# Patient Record
Sex: MALE | Race: WHITE | Employment: OTHER | URBAN - METROPOLITAN AREA
[De-identification: names, ages, dates, MRNs, and addresses within clinical notes are randomized per-mention and may not be internally consistent; named-entity substitution may affect disease eponyms.]

---

## 2017-03-23 ENCOUNTER — HOSPITAL ENCOUNTER (OUTPATIENT)
Dept: GENERAL RADIOLOGY | Age: 82
Discharge: HOME | End: 2017-03-23
Payer: COMMERCIAL

## 2017-03-23 DIAGNOSIS — M54.12 CERVICAL RADICULOPATHY, ACUTE: ICD-10-CM

## 2017-03-23 PROCEDURE — 72050 X-RAY EXAM NECK SPINE 4/5VWS: CPT
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
## 2017-06-24 PROBLEM — M54.12 CERVICAL RADICULOPATHY, ACUTE: Status: ACTIVE | Noted: 2017-06-24

## 2017-06-24 PROBLEM — M54.2 ACUTE NECK PAIN: Status: ACTIVE | Noted: 2017-06-24
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
## 2022-03-19 PROBLEM — M54.12 CERVICAL RADICULOPATHY, ACUTE: Status: ACTIVE | Noted: 2017-06-24

## 2022-03-19 PROBLEM — M54.2 ACUTE NECK PAIN: Status: ACTIVE | Noted: 2017-06-24

## 2022-10-03 ENCOUNTER — OFFICE VISIT (OUTPATIENT)
Dept: CARDIOLOGY CLINIC | Age: 87
End: 2022-10-03
Payer: COMMERCIAL

## 2022-10-03 VITALS
WEIGHT: 170 LBS | SYSTOLIC BLOOD PRESSURE: 150 MMHG | HEIGHT: 67 IN | HEART RATE: 70 BPM | DIASTOLIC BLOOD PRESSURE: 82 MMHG | BODY MASS INDEX: 26.68 KG/M2

## 2022-10-03 DIAGNOSIS — I25.119 CORONARY ARTERY DISEASE INVOLVING NATIVE CORONARY ARTERY OF NATIVE HEART WITH ANGINA PECTORIS (HCC): Primary | ICD-10-CM

## 2022-10-03 DIAGNOSIS — E78.2 MIXED HYPERLIPIDEMIA: ICD-10-CM

## 2022-10-03 DIAGNOSIS — I10 ESSENTIAL (PRIMARY) HYPERTENSION: ICD-10-CM

## 2022-10-03 PROCEDURE — 99214 OFFICE O/P EST MOD 30 MIN: CPT | Performed by: INTERNAL MEDICINE

## 2022-10-03 PROCEDURE — 93000 ELECTROCARDIOGRAM COMPLETE: CPT | Performed by: INTERNAL MEDICINE

## 2022-10-03 PROCEDURE — 1123F ACP DISCUSS/DSCN MKR DOCD: CPT | Performed by: INTERNAL MEDICINE

## 2022-10-03 RX ORDER — ASPIRIN 81 MG/1
81 TABLET ORAL DAILY
COMMUNITY

## 2022-10-03 NOTE — PROGRESS NOTES
[address] [place]  [address], [address]  PHONE: [phone]    SUBJECTIVE: /HPI  Dusty Quintero (1935) is a 82 y.o. male seen for a follow up visit regarding the following:        ICD-10-CM ICD-9-CM   1. Coronary artery disease involving native coronary artery of native heart with angina pectoris ([facility] [address]  I25.119 414.01     413.9   2. Essential hypertension  I10 401.9   3. Mixed hyperlipidemia  E78.2 272.2     Pt doing well. No chest pain. No palpitations. Patient denies syncope. No dyspnea. States they are taking meds. Maintains a normal level of activity for them without symptoms. No dizziness or lightheadedness. All above conditions stable. Pt reports taking blood pressure measurements at home and that they are controlled. Past Medical History, Past Surgical History, Family history, Social History, and Medications were all reviewed with the patient today and updated as necessary. Outpatient Medications Marked as Taking for the 3/29/22 encounter (Office Visit) with Sanjuanita Kyle MD   Medication Sig Dispense Refill    zonisamide (ZONEGRAN) 50 mg capsule Take  by mouth daily. b complex vitamins tablet Take 1 Tablet by mouth daily. amLODIPine (NORVASC) 5 mg tablet Take 5 mg by mouth daily. vit A,C,E-zinc-copper (PreserVision AREDS) cap capsule Take 1 Cap by mouth.      losartan (COZAAR) 50 mg tablet Take 25 mg by mouth daily. allopurinol (ZYLOPRIM) 100 mg tablet Take  by mouth daily. nitroglycerin (NITROSTAT) 0.4 mg SL tablet 1 Tab by SubLINGual route every five (5) minutes as needed for Chest Pain. 25 Tab 5    HYDROcodone-acetaminophen (NORCO) 5-325 mg per tablet Take 1 Tab by mouth every four (4) hours as needed for Pain. Prn pain      omeprazole (PRILOSEC) 40 mg capsule Take 40 mg by mouth daily. Aspirin, Buffered 81 mg tab Take 81 mg by mouth daily. atorvastatin (LIPITOR) 10 mg tablet Take 10 mg by mouth daily. Allergies   Allergen Reactions    Gabapentin Unknown (comments)     Depressed    Indocin [Indomethacin] Unknown (comments)    Lyrica [Pregabalin] Other (comments)     Depression      Past Medical History:   Diagnosis Date    Anemia     Arthritis     Coronary artery disease involving native coronary artery of native heart with angina pectoris ([facility] [address] 10/20/2016    Dyspnea on exertion 10/20/2016    Elevated prostate specific antigen (PSA)     GERD (gastroesophageal reflux disease)     H/O blood clots     Heart disease     Hyperlipidemia     Hypertension     Hypertrophy of prostate without urinary obstruction and other lower urinary tract symptoms (LUTS)     Localized edema 10/20/2016    Neoplasm of uncertain behavior of prostate      Past Surgical History:   Procedure Laterality Date    HX BACK SURGERY  1979    HX HERNIA REPAIR      HX MOHS PROCEDURES      HX ORTHOPAEDIC  2004    neck surgery    HX ORTHOPAEDIC      foot surgery    HX OTHER SURGICAL      Laminectomy    HX OTHER SURGICAL      Hydrocelectomy    HX ROTATOR CUFF REPAIR Bilateral      Family History   Problem Relation Age of Onset    Hypertension Mother     Cancer Father     Heart Disease Father     Other Father         kidney stones    Other Other         arthritis      Social History     Tobacco Use    Smoking status: Never Smoker    Smokeless tobacco: Former User   Substance Use Topics    Alcohol use: No     Pt does   have history of early onset cad or heart failure in immediate family members    Current Outpatient Medications:     zonisamide (ZONEGRAN) 50 mg capsule, Take  by mouth daily. , Disp: , Rfl:     b complex vitamins tablet, Take 1 Tablet by mouth daily. , Disp: , Rfl:     amLODIPine (NORVASC) 5 mg tablet, Take 5 mg by mouth daily. , Disp: , Rfl:     vit A,C,E-zinc-copper (PreserVision AREDS) cap capsule, Take 1 Cap by mouth., Disp: , Rfl:     losartan (COZAAR) 50 mg tablet, Take 25 mg by mouth daily. , Disp: , Rfl:     allopurinol (ZYLOPRIM) 100 mg tablet, Take  by mouth daily. , Disp: , Rfl:     nitroglycerin (NITROSTAT) 0.4 mg SL tablet, 1 Tab by SubLINGual route every five (5) minutes as needed for Chest Pain., Disp: 25 Tab, Rfl: 5    HYDROcodone-acetaminophen (NORCO) 5-325 mg per tablet, Take 1 Tab by mouth every four (4) hours as needed for Pain. Prn pain, Disp: , Rfl:     omeprazole (PRILOSEC) 40 mg capsule, Take 40 mg by mouth daily. , Disp: , Rfl:     Aspirin, Buffered 81 mg tab, Take 81 mg by mouth daily. , Disp: , Rfl:     atorvastatin (LIPITOR) 10 mg tablet, Take 10 mg by mouth daily. , Disp: , Rfl:         ROS:  Review of Systems - General ROS: negative for - chills, fatigue or fever  Hematological and Lymphatic ROS: negative for - bleeding problems, bruising or jaundice  Respiratory ROS: no cough, shortness of breath, or wheezing  Cardiovascular ROS: no chest pain or dyspnea on exertion  Gastrointestinal ROS: no abdominal pain, change in bowel habits, or black or bloody stools  Neurological ROS: no TIA or stroke symptoms  All other systems negative.       Wt Readings from Last 3 Encounters:   03/29/22 179 lb 9.6 oz (81.5 kg)   09/28/21 180 lb (81.6 kg)   03/23/21 183 lb (83 kg)     Temp Readings from Last 3 Encounters:   08/23/17 97.2 °F (36.2 °C)   06/23/17 96.1 °F (35.6 °C)     BP Readings from Last 3 Encounters:   03/29/22 132/70   09/28/21 138/80   03/23/21 122/78     Pulse Readings from Last 3 Encounters:   03/29/22 60   09/28/21 66   03/23/21 64           PHYSICAL EXAM:  Visit Vitals  /70   Pulse 60   Ht 5' 7.5\" (1.715 m)   Wt 179 lb 9.6 oz (81.5 kg)   BMI 27.71 kg/m²       Physical Examination: General appearance - alert, well appearing, and in no distress  Mental status - alert, oriented to person, place, and time  Eyes - pupils equal and reactive, extraocular eye movements intact  Neck/lymph - supple, no significant adenopathy  Chest/lungs - clear to auscultation, no wheezes, rales or rhonchi, symmetric air entry  Heart/CV - normal rate, regular rhythm, normal S1, S2, no murmurs, rubs, clicks or gallops  Abdomen/GI - soft, nontender, nondistended, no masses or organomegaly  Musculoskeletal - no joint tenderness, deformity or swelling  Extremities - peripheral pulses normal, no pedal edema, no clubbing or cyanosis  Skin - normal coloration and turgor, no rashes, no suspicious skin lesions noted    EKG: normal EKG, normal sinus rhythm, unchanged from previous tracings. Medications reviewed and questions answered    No results found for this or any previous visit (from the past 672 hour(s)). No results found for: CHOL, CHOLPOCT, CHOLX, CHLST, CHOLV, HDL, HDLPOC, HDLP, LDL, LDLCPOC, LDLC, DLDLP, VLDLC, VLDL, TGLX, TRIGL, TRIGP, TGLPOCT, CHHD, CHHDX      ASSESSMENT and PLAN        1. Coronary artery disease involving native coronary artery of native heart with angina pectoris [facility])  The current medical regimen is effective;  continue present plan and medications. 2. Essential hypertension  The current medical regimen is effective;  continue present plan and medications. 3. Mixed hyperlipidemia  The current medical regimen is effective;  continue present plan and medications. Orders Placed This Encounter    zonisamide (ZONEGRAN) 50 mg capsule     Sig: Take  by mouth daily. Pt is instructed to follow all appropriate cardiac risk factor recommendations and to be compliant with meds and testing. Instructed to follow up appropriately and seek urgent medical care if acute or unstable issues arise. Results of some tests may be viewed thru [address] but this does not substitute for follow up with MD. If follow up is not scheduled pt is instructed to call for follow up      Follow-up and Dispositions    Return in about 6 months (around 9/29/2022).       is for   Specialty Problems          Cardiology Problems    Coronary artery disease involving native coronary artery of native heart with angina pectoris ([facility] [address] Localized edema        Hyperlipidemia        Hypertension                       Sandhya Rosario MD  3/29/2022  1:29 PM

## 2023-04-06 ENCOUNTER — OFFICE VISIT (OUTPATIENT)
Dept: CARDIOLOGY CLINIC | Age: 88
End: 2023-04-06
Payer: COMMERCIAL

## 2023-04-06 VITALS
DIASTOLIC BLOOD PRESSURE: 92 MMHG | SYSTOLIC BLOOD PRESSURE: 148 MMHG | HEART RATE: 55 BPM | HEIGHT: 67 IN | BODY MASS INDEX: 27.15 KG/M2 | WEIGHT: 173 LBS

## 2023-04-06 DIAGNOSIS — I25.119 ATHEROSCLEROSIS OF NATIVE CORONARY ARTERY OF NATIVE HEART WITH ANGINA PECTORIS (HCC): ICD-10-CM

## 2023-04-06 DIAGNOSIS — I10 ESSENTIAL (PRIMARY) HYPERTENSION: ICD-10-CM

## 2023-04-06 DIAGNOSIS — E78.2 MIXED HYPERLIPIDEMIA: ICD-10-CM

## 2023-04-06 DIAGNOSIS — I25.119 CORONARY ARTERY DISEASE INVOLVING NATIVE CORONARY ARTERY OF NATIVE HEART WITH ANGINA PECTORIS (HCC): Primary | ICD-10-CM

## 2023-04-06 PROCEDURE — 1123F ACP DISCUSS/DSCN MKR DOCD: CPT | Performed by: INTERNAL MEDICINE

## 2023-04-06 PROCEDURE — 3077F SYST BP >= 140 MM HG: CPT | Performed by: INTERNAL MEDICINE

## 2023-04-06 PROCEDURE — 93000 ELECTROCARDIOGRAM COMPLETE: CPT | Performed by: INTERNAL MEDICINE

## 2023-04-06 PROCEDURE — 99214 OFFICE O/P EST MOD 30 MIN: CPT | Performed by: INTERNAL MEDICINE

## 2023-04-06 PROCEDURE — 3080F DIAST BP >= 90 MM HG: CPT | Performed by: INTERNAL MEDICINE

## 2023-04-06 RX ORDER — LOSARTAN POTASSIUM 100 MG/1
100 TABLET ORAL DAILY
Qty: 90 TABLET | Refills: 3 | Status: SHIPPED | OUTPATIENT
Start: 2023-04-06

## 2023-04-06 NOTE — PROGRESS NOTES
[address]  PHONE: [phone]    SUBJECTIVE: Nancy Colon (1935) is a 82 y.o. male seen for a follow up visit regarding the following:   Specialty Problems          Cardiology Problems    Chest pain        Coronary artery disease involving native coronary artery of native heart with angina pectoris ([facility] [address]        Hyperlipidemia        Hypertension         Patient has been experiencing SOB after taking baclofen after a failed back procedure. He only took this medicine for a few days when he noticed his heart skipping a beat. He had an episode of palpitations this morning but denies CP associated with this event. Past Medical History, Past Surgical History, Family history, Social History, and Medications were all reviewed with the patient today and updated as necessary.     Allergies   Allergen Reactions    Gabapentin Other (See Comments)     Depressed    Indomethacin Other (See Comments)    Pregabalin Other (See Comments)     Depression      Past Medical History:   Diagnosis Date    Anemia     Arthritis     Coronary artery disease involving native coronary artery of native heart with angina pectoris ([facility] [address] 10/20/2016    Dyspnea on exertion 10/20/2016    Elevated prostate specific antigen (PSA)     GERD (gastroesophageal reflux disease)     H/O blood clots     Heart disease     Hyperlipidemia     Hypertension     Hypertrophy of prostate without urinary obstruction and other lower urinary tract symptoms (LUTS)     Localized edema 10/20/2016    Neoplasm of uncertain behavior of prostate      Past Surgical History:   Procedure Laterality Date    [facility]      foot surgery    ORTHOPEDIC SURGERY  2004    neck surgery    OTHER SURGICAL HISTORY      Hydrocelectomy    OTHER SURGICAL HISTORY      Laminectomy    ROTATOR CUFF REPAIR Bilateral      Family History   Problem

## 2023-04-08 LAB
25(OH)D3+25(OH)D2 SERPL-MCNC: 38.4 NG/ML (ref 30–100)
ALBUMIN SERPL-MCNC: 4.2 G/DL (ref 3.6–4.6)
ALBUMIN/GLOB SERPL: 1.8 {RATIO} (ref 1.2–2.2)
ALP SERPL-CCNC: 122 IU/L (ref 44–121)
ALT SERPL-CCNC: 31 IU/L (ref 0–44)
AST SERPL-CCNC: 30 IU/L (ref 0–40)
BASOPHILS # BLD AUTO: 0.1 X10E3/UL (ref 0–0.2)
BASOPHILS NFR BLD AUTO: 1 %
BILIRUB SERPL-MCNC: 0.6 MG/DL (ref 0–1.2)
BUN SERPL-MCNC: 26 MG/DL (ref 8–27)
BUN/CREAT SERPL: 18 (ref 10–24)
CALCIUM SERPL-MCNC: 9.5 MG/DL (ref 8.6–10.2)
CHLORIDE SERPL-SCNC: 105 MMOL/L (ref 96–106)
CHOLEST SERPL-MCNC: 136 MG/DL (ref 100–199)
CO2 SERPL-SCNC: 22 MMOL/L (ref 20–29)
CREAT SERPL-MCNC: 1.43 MG/DL (ref 0.76–1.27)
EGFRCR SERPLBLD CKD-EPI 2021: 48 ML/MIN/1.73
EOSINOPHIL # BLD AUTO: 0.4 X10E3/UL (ref 0–0.4)
EOSINOPHIL NFR BLD AUTO: 5 %
ERYTHROCYTE [DISTWIDTH] IN BLOOD BY AUTOMATED COUNT: 13 % (ref 11.6–15.4)
GLOBULIN SER CALC-MCNC: 2.4 G/DL (ref 1.5–4.5)
GLUCOSE SERPL-MCNC: 94 MG/DL (ref 70–99)
HCT VFR BLD AUTO: 45.4 % (ref 37.5–51)
HDLC SERPL-MCNC: 43 MG/DL
HGB BLD-MCNC: 15.3 G/DL (ref 13–17.7)
IMM GRANULOCYTES # BLD AUTO: 0 X10E3/UL (ref 0–0.1)
IMM GRANULOCYTES NFR BLD AUTO: 0 %
LDLC SERPL CALC-MCNC: 70 MG/DL (ref 0–99)
LYMPHOCYTES # BLD AUTO: 3 X10E3/UL (ref 0.7–3.1)
LYMPHOCYTES NFR BLD AUTO: 43 %
MCH RBC QN AUTO: 30.6 PG (ref 26.6–33)
MCHC RBC AUTO-ENTMCNC: 33.7 G/DL (ref 31.5–35.7)
MCV RBC AUTO: 91 FL (ref 79–97)
MONOCYTES # BLD AUTO: 0.4 X10E3/UL (ref 0.1–0.9)
MONOCYTES NFR BLD AUTO: 6 %
NEUTROPHILS # BLD AUTO: 3.1 X10E3/UL (ref 1.4–7)
NEUTROPHILS NFR BLD AUTO: 45 %
PLATELET # BLD AUTO: 180 X10E3/UL (ref 150–450)
POTASSIUM SERPL-SCNC: 5 MMOL/L (ref 3.5–5.2)
PROT SERPL-MCNC: 6.6 G/DL (ref 6–8.5)
RBC # BLD AUTO: 5 X10E6/UL (ref 4.14–5.8)
SODIUM SERPL-SCNC: 145 MMOL/L (ref 134–144)
SPECIMEN STATUS REPORT: NORMAL
TRIGL SERPL-MCNC: 129 MG/DL (ref 0–149)
TSH SERPL DL<=0.005 MIU/L-ACNC: 2.67 UIU/ML (ref 0.45–4.5)
VLDLC SERPL CALC-MCNC: 23 MG/DL (ref 5–40)
WBC # BLD AUTO: 7 X10E3/UL (ref 3.4–10.8)

## 2023-07-11 ENCOUNTER — OFFICE VISIT (OUTPATIENT)
Age: 88
End: 2023-07-11
Payer: COMMERCIAL

## 2023-07-11 VITALS
BODY MASS INDEX: 26.93 KG/M2 | HEART RATE: 58 BPM | SYSTOLIC BLOOD PRESSURE: 136 MMHG | WEIGHT: 171.6 LBS | DIASTOLIC BLOOD PRESSURE: 78 MMHG | HEIGHT: 67 IN

## 2023-07-11 DIAGNOSIS — E78.2 MIXED HYPERLIPIDEMIA: ICD-10-CM

## 2023-07-11 DIAGNOSIS — I10 ESSENTIAL (PRIMARY) HYPERTENSION: ICD-10-CM

## 2023-07-11 DIAGNOSIS — I25.119 CORONARY ARTERY DISEASE INVOLVING NATIVE CORONARY ARTERY OF NATIVE HEART WITH ANGINA PECTORIS (HCC): Primary | ICD-10-CM

## 2023-07-11 PROCEDURE — 99214 OFFICE O/P EST MOD 30 MIN: CPT | Performed by: INTERNAL MEDICINE

## 2023-07-11 PROCEDURE — 1123F ACP DISCUSS/DSCN MKR DOCD: CPT | Performed by: INTERNAL MEDICINE

## 2023-07-11 RX ORDER — LOSARTAN POTASSIUM 100 MG/1
100 TABLET ORAL DAILY
Qty: 90 TABLET | Refills: 3 | Status: SHIPPED | OUTPATIENT
Start: 2023-07-11

## 2023-07-11 RX ORDER — NITROGLYCERIN 0.4 MG/1
0.4 TABLET SUBLINGUAL EVERY 5 MIN PRN
Qty: 25 TABLET | Refills: 5 | Status: SHIPPED | OUTPATIENT
Start: 2023-07-11

## 2023-07-11 NOTE — PROGRESS NOTES
[address]  PHONE: [phone]    SUBJECTIVE: Claire Epps (1935) is a 82 y.o. male seen for a follow up visit regarding the following:   Specialty Problems          Cardiology Problems    Chest pain        Coronary artery disease involving native coronary artery of native heart with angina pectoris ([facility])        Hyperlipidemia        Hypertension         Sean Barreto is an 16-year-old male who presents to the office for a 3 month f/u evaluation. At his last visit, losartan was increased to 100 mg. Lab work on 4/07/23 was normal.     Today, he states that he is doing well. He notes occasional SOB. He has not had any chest pain or palpitations. He notes new onset left hand numbness over the last few weeks. This is limited to his hand. Past Medical History, Past Surgical History, Family history, Social History, and Medications were all reviewed with the patient today and updated as necessary.     Allergies   Allergen Reactions    Gabapentin Other (See Comments)     Depressed    Indomethacin Other (See Comments)    Pregabalin Other (See Comments)     Depression      Past Medical History:   Diagnosis Date    Anemia     Arthritis     Coronary artery disease involving native coronary artery of native heart with angina pectoris ([address]) 10/20/2016    Dyspnea on exertion 10/20/2016    Elevated prostate specific antigen (PSA)     GERD (gastroesophageal reflux disease)     H/O blood clots     Heart disease     Hyperlipidemia     Hypertension     Hypertrophy of prostate without urinary obstruction and other lower urinary tract symptoms (LUTS)     Localized edema 10/20/2016    Neoplasm of uncertain behavior of prostate      Past Surgical History:   Procedure Laterality Date    [address]       foot surgery    ORTHOPEDIC SURGERY  2004    neck surgery    OTHER SURGICAL HISTORY

## 2024-04-08 NOTE — PROGRESS NOTES
[facility] CARDIOLOGY, [address], [address]  [place] [address]  PHONE: [phone]    SUBJECTIVE: /HPI  Davon Buchanan (1935) is a 88 y.o. male seen for a follow up visit regarding the following:   Specialty Problems          Cardiology Problems    Chest pain        Coronary artery disease involving native coronary artery of native heart with angina pectoris (H[facility])        Hyperlipidemia        Hypertension         Patient doing well without acute complaints. No  orthopnea, palpitations, syncope, pre-syncope. Compliant with medications. No medication side effects.     Reports chest pain that starts when he is exerting himself shoveling. Lasts 15 minutes. Did not take nitroglycerin. Does report some dyspnea on exertion when climbing stairs. Thinks this may have gotten a little worse.      Past Medical History, Past Surgical History, Family history, Social History, and Medications were all reviewed with the patient today and updated as necessary.    Allergies   Allergen Reactions    Gabapentin Other (See Comments)     Depressed    Indomethacin Other (See Comments)    Pregabalin Other (See Comments)     Depression      Past Medical History:   Diagnosis Date    Anemia     Arthritis     Coronary artery disease involving native coronary artery of native heart with angina pectoris (H[facility]) 10/20/2016    Dyspnea on exertion 10/20/2016    Elevated prostate specific antigen (PSA)     GERD (gastroesophageal reflux disease)     H/O blood clots     Heart disease     Hyperlipidemia     Hypertension     Hypertrophy of prostate without urinary obstruction and other lower urinary tract symptoms (LUTS)     Localized edema 10/20/2016    Neoplasm of uncertain behavior of prostate      Past Surgical History:   Procedure Laterality Date    BACK SURGERY  1979    HERNIA REPAIR      MOHS SURGERY      ORTHOPEDIC SURGERY      foot surgery    ORTHOPEDIC SURGERY  2004    neck surgery    OTHER SURGICAL HISTORY

## 2024-04-09 ENCOUNTER — OFFICE VISIT (OUTPATIENT)
Age: 89
End: 2024-04-09
Payer: COMMERCIAL

## 2024-04-09 VITALS
HEIGHT: 69 IN | DIASTOLIC BLOOD PRESSURE: 66 MMHG | HEART RATE: 59 BPM | SYSTOLIC BLOOD PRESSURE: 128 MMHG | WEIGHT: 181.2 LBS | BODY MASS INDEX: 26.84 KG/M2

## 2024-04-09 DIAGNOSIS — I25.119 ATHEROSCLEROSIS OF NATIVE CORONARY ARTERY OF NATIVE HEART WITH ANGINA PECTORIS (HCC): Primary | ICD-10-CM

## 2024-04-09 DIAGNOSIS — R07.2 PRECORDIAL PAIN: ICD-10-CM

## 2024-04-09 DIAGNOSIS — I10 ESSENTIAL (PRIMARY) HYPERTENSION: ICD-10-CM

## 2024-04-09 DIAGNOSIS — E78.2 MIXED HYPERLIPIDEMIA: ICD-10-CM

## 2024-04-09 PROCEDURE — 1123F ACP DISCUSS/DSCN MKR DOCD: CPT | Performed by: INTERNAL MEDICINE

## 2024-04-09 PROCEDURE — 99214 OFFICE O/P EST MOD 30 MIN: CPT | Performed by: INTERNAL MEDICINE

## 2024-04-09 PROCEDURE — 93000 ELECTROCARDIOGRAM COMPLETE: CPT | Performed by: INTERNAL MEDICINE

## 2024-04-09 RX ORDER — PREGABALIN 25 MG/1
25 CAPSULE ORAL 2 TIMES DAILY
COMMUNITY

## 2024-04-09 RX ORDER — LOSARTAN POTASSIUM 100 MG/1
100 TABLET ORAL DAILY
Qty: 90 TABLET | Refills: 3 | Status: SHIPPED | OUTPATIENT
Start: 2024-04-09

## 2024-04-09 RX ORDER — NITROGLYCERIN 0.4 MG/1
0.4 TABLET SUBLINGUAL EVERY 5 MIN PRN
Qty: 25 TABLET | Refills: 5 | Status: SHIPPED | OUTPATIENT
Start: 2024-04-09

## 2024-07-12 NOTE — PROGRESS NOTES
modification and healthy lifestyle.      Pt is instructed to follow all appropriate cardiac risk factor recommendations and to be compliant with meds and testing. Instructed to follow up appropriately and seek urgent medical care if acute or unstable issues arise. Results of some tests may be viewed thru MyChart but this does not substitute for follow up with MD. If follow up is not scheduled pt is instructed to call for follow up. Any non cardiac issues identified in this visit the patient is counseled to address these with their primary care physician or appropriate specialist.    I have personally seen and evaluated the patient and reviewed the students note and agree with the following assessment and plan and findings. I was present for and observed the key components of this note.  Any appropriate additions or editing of the information have been done by me.    Milan Murillo MD, FACC  Cardiology

## 2024-07-16 ENCOUNTER — OFFICE VISIT (OUTPATIENT)
Age: 89
End: 2024-07-16
Payer: COMMERCIAL

## 2024-07-16 VITALS
BODY MASS INDEX: 24.5 KG/M2 | WEIGHT: 175 LBS | DIASTOLIC BLOOD PRESSURE: 84 MMHG | SYSTOLIC BLOOD PRESSURE: 139 MMHG | HEART RATE: 60 BPM | HEIGHT: 71 IN

## 2024-07-16 DIAGNOSIS — I25.119 ATHEROSCLEROSIS OF NATIVE CORONARY ARTERY OF NATIVE HEART WITH ANGINA PECTORIS (HCC): Primary | ICD-10-CM

## 2024-07-16 DIAGNOSIS — I10 ESSENTIAL (PRIMARY) HYPERTENSION: ICD-10-CM

## 2024-07-16 DIAGNOSIS — E78.2 MIXED HYPERLIPIDEMIA: ICD-10-CM

## 2024-07-16 PROCEDURE — 1123F ACP DISCUSS/DSCN MKR DOCD: CPT | Performed by: INTERNAL MEDICINE

## 2024-07-16 PROCEDURE — 99214 OFFICE O/P EST MOD 30 MIN: CPT | Performed by: INTERNAL MEDICINE

## 2024-07-16 RX ORDER — LOSARTAN POTASSIUM 100 MG/1
100 TABLET ORAL DAILY
Qty: 90 TABLET | Refills: 3 | Status: SHIPPED | OUTPATIENT
Start: 2024-07-16

## 2024-07-16 RX ORDER — NITROGLYCERIN 0.4 MG/1
0.4 TABLET SUBLINGUAL EVERY 5 MIN PRN
Qty: 25 TABLET | Refills: 5 | Status: SHIPPED | OUTPATIENT
Start: 2024-07-16

## 2025-01-21 ENCOUNTER — OFFICE VISIT (OUTPATIENT)
Age: 89
End: 2025-01-21
Payer: COMMERCIAL

## 2025-01-21 VITALS
SYSTOLIC BLOOD PRESSURE: 122 MMHG | DIASTOLIC BLOOD PRESSURE: 76 MMHG | BODY MASS INDEX: 24.5 KG/M2 | HEART RATE: 68 BPM | WEIGHT: 175 LBS | HEIGHT: 71 IN

## 2025-01-21 DIAGNOSIS — E78.2 MIXED HYPERLIPIDEMIA: ICD-10-CM

## 2025-01-21 DIAGNOSIS — I10 ESSENTIAL (PRIMARY) HYPERTENSION: ICD-10-CM

## 2025-01-21 DIAGNOSIS — I25.119 ATHEROSCLEROSIS OF NATIVE CORONARY ARTERY OF NATIVE HEART WITH ANGINA PECTORIS (HCC): Primary | ICD-10-CM

## 2025-01-21 PROCEDURE — 99214 OFFICE O/P EST MOD 30 MIN: CPT | Performed by: INTERNAL MEDICINE

## 2025-01-21 PROCEDURE — 1126F AMNT PAIN NOTED NONE PRSNT: CPT | Performed by: INTERNAL MEDICINE

## 2025-01-21 PROCEDURE — 1123F ACP DISCUSS/DSCN MKR DOCD: CPT | Performed by: INTERNAL MEDICINE

## 2025-01-21 NOTE — PROGRESS NOTES
allopurinol (ZYLOPRIM) 100 MG tablet, Take by mouth daily, Disp: , Rfl:     amLODIPine (NORVASC) 5 MG tablet, Take 1 tablet by mouth daily, Disp: , Rfl:     atorvastatin (LIPITOR) 10 MG tablet, Take 1 tablet by mouth daily, Disp: , Rfl:     HYDROcodone-acetaminophen (NORCO) 5-325 MG per tablet, Take 1 tablet by mouth every 4 hours as needed. 7-325, Disp: , Rfl:     omeprazole (PRILOSEC) 40 MG delayed release capsule, Take 1 capsule by mouth daily, Disp: , Rfl:     zonisamide (ZONEGRAN) 50 MG capsule, Take by mouth daily, Disp: , Rfl:     OSCAR GALARZA MD  1/21/2025  12:42 PM    Appropriate evaluation of guideline directed medical therapy for the patient's conditions have been reviewed.  If appropriate, adjustment of therapy for underlying cardiac issues has been offered.  If patient wishes for adjustment of therapy which would include intensification of pharmacologic therapy for any above conditions this will be sent to appropriate pharmacy.  If patient politely declines any further changes they will be encouraged to continue with current treatment and appropriate risk factor modification and healthy lifestyle.      Pt is instructed to follow all appropriate cardiac risk factor recommendations and to be compliant with meds and testing. Instructed to follow up appropriately and seek urgent medical care if acute or unstable issues arise. Results of some tests may be viewed thru MyChart but this does not substitute for follow up with MD. If follow up is not scheduled pt is instructed to call for follow up.  Any non cardiac issues identified in this visit the patient is counseled to address these with their primary care physician or appropriate specialist.    OSCAR GALARZA MD

## 2025-07-29 ENCOUNTER — OFFICE VISIT (OUTPATIENT)
Age: 89
End: 2025-07-29
Payer: COMMERCIAL

## 2025-07-29 VITALS
WEIGHT: 176 LBS | HEIGHT: 71 IN | DIASTOLIC BLOOD PRESSURE: 86 MMHG | SYSTOLIC BLOOD PRESSURE: 148 MMHG | BODY MASS INDEX: 24.64 KG/M2 | HEART RATE: 58 BPM

## 2025-07-29 DIAGNOSIS — I25.119 CORONARY ARTERY DISEASE INVOLVING NATIVE CORONARY ARTERY OF NATIVE HEART WITH ANGINA PECTORIS: Primary | ICD-10-CM

## 2025-07-29 DIAGNOSIS — E78.2 MIXED HYPERLIPIDEMIA: ICD-10-CM

## 2025-07-29 DIAGNOSIS — I10 PRIMARY HYPERTENSION: ICD-10-CM

## 2025-07-29 PROCEDURE — 1123F ACP DISCUSS/DSCN MKR DOCD: CPT | Performed by: INTERNAL MEDICINE

## 2025-07-29 PROCEDURE — 93000 ELECTROCARDIOGRAM COMPLETE: CPT | Performed by: INTERNAL MEDICINE

## 2025-07-29 PROCEDURE — 99214 OFFICE O/P EST MOD 30 MIN: CPT | Performed by: INTERNAL MEDICINE

## 2025-07-29 PROCEDURE — 1126F AMNT PAIN NOTED NONE PRSNT: CPT | Performed by: INTERNAL MEDICINE

## 2025-07-29 RX ORDER — LOSARTAN POTASSIUM 100 MG/1
100 TABLET ORAL DAILY
Qty: 90 TABLET | Refills: 3 | Status: SHIPPED | OUTPATIENT
Start: 2025-07-29

## 2025-07-29 NOTE — PROGRESS NOTES
[facility] CARDIOLOGY, [address], [address]  [place] [address]  PHONE: [phone]    SUBJECTIVE: /HPI  Davon Buchanan (1935) is a 89 y.o. male seen for a follow up visit regarding the following:   Specialty Problems          Cardiology Problems    Chest pain        Coronary artery disease involving native coronary artery of native heart with angina pectoris        Hyperlipidemia        Hypertension         Patient presents to office feeling fatigued but overall doing well. BP today is 148/86. Reports mild, occasional chest pain. Reports L hand numbness multiple times a day. Reports occasional heart palpitations. Denies syncope. Pt states he struggles to fall sleep and wakes up often to use restroom. Pt states he's up to date and consistent with medications.    Past Medical History, Past Surgical History, Family history, Social History, and Medications were all reviewed with the patient today and updated as necessary.    Allergies   Allergen Reactions    Gabapentin Other (See Comments)     Depressed    Indomethacin Other (See Comments)    Pregabalin Other (See Comments)     Depression      Past Medical History:   Diagnosis Date    Anemia     Arthritis     Coronary artery disease involving native coronary artery of native heart with angina pectoris 10/20/2016    Dyspnea on exertion 10/20/2016    Elevated prostate specific antigen (PSA)     GERD (gastroesophageal reflux disease)     H/O blood clots     Heart disease     Hyperlipidemia     Hypertension     Hypertrophy of prostate without urinary obstruction and other lower urinary tract symptoms (LUTS)     Localized edema 10/20/2016    Neoplasm of uncertain behavior of prostate      Past Surgical History:   Procedure Laterality Date    BACK SURGERY  1979    HERNIA REPAIR      MOHS SURGERY      ORTHOPEDIC SURGERY      foot surgery    ORTHOPEDIC SURGERY  2004    neck surgery    OTHER SURGICAL HISTORY      Hydrocelectomy    OTHER SURGICAL HISTORY